# Patient Record
(demographics unavailable — no encounter records)

---

## 2025-07-08 NOTE — PHYSICAL EXAM
[Appropriately responsive] : appropriately responsive [Alert] : alert [No Acute Distress] : no acute distress [Oriented x3] : oriented x3 [FreeTextEntry1] : deferred exam- virginal

## 2025-07-08 NOTE — HISTORY OF PRESENT ILLNESS
[Y] : Patient uses contraception [Oral Contraceptive] : uses oral contraception pills [N] : Patient denies prior pregnancies [LMPDate] : 06/05/25 [MensesFreq] : 28 [MensesLength] : 3-5 [MensesAmount] : light [Normal Amount/Duration] :  normal amount and duration [Regular Cycle Intervals] : periods have been regular [FreeTextEntry1] : 06/05/25 [No] : Patient does not have concerns regarding sex [Never active] : never active